# Patient Record
Sex: FEMALE | Race: WHITE
[De-identification: names, ages, dates, MRNs, and addresses within clinical notes are randomized per-mention and may not be internally consistent; named-entity substitution may affect disease eponyms.]

---

## 2018-04-11 ENCOUNTER — HOSPITAL ENCOUNTER (OUTPATIENT)
Dept: HOSPITAL 46 - DS | Age: 43
Discharge: HOME | End: 2018-04-11
Attending: OBSTETRICS & GYNECOLOGY
Payer: COMMERCIAL

## 2018-04-11 VITALS — WEIGHT: 205.01 LBS | HEIGHT: 68 IN | BODY MASS INDEX: 31.07 KG/M2

## 2018-04-11 DIAGNOSIS — D49.59: Primary | ICD-10-CM

## 2018-04-11 DIAGNOSIS — N80.0: ICD-10-CM

## 2018-04-11 DIAGNOSIS — Z98.890: ICD-10-CM

## 2018-04-11 DIAGNOSIS — N80.1: ICD-10-CM

## 2018-04-11 PROCEDURE — 0UT94ZZ RESECTION OF UTERUS, PERCUTANEOUS ENDOSCOPIC APPROACH: ICD-10-PCS | Performed by: OBSTETRICS & GYNECOLOGY

## 2018-04-11 PROCEDURE — 0U524ZZ DESTRUCTION OF BILATERAL OVARIES, PERCUTANEOUS ENDOSCOPIC APPROACH: ICD-10-PCS | Performed by: OBSTETRICS & GYNECOLOGY

## 2018-04-11 PROCEDURE — 0UT74ZZ RESECTION OF BILATERAL FALLOPIAN TUBES, PERCUTANEOUS ENDOSCOPIC APPROACH: ICD-10-PCS | Performed by: OBSTETRICS & GYNECOLOGY

## 2018-04-11 NOTE — NUR
LE 1200: PT ASSITED TO STAND UP BY THE BED TO REMOVE RORDIGUEZ. 10CC REMOVED FROM
RODRIGUEZ [place]OON, 1200 OF URINE IN BAG. PT REQUESTS WALKING AROUND THE DEPARTMENT
A LITTLE BIT BEFORE STOPPING IN THE BATHROOM. SHE IS ABLE TO URINATE, 15ML IN
HAT, PT REPORTS MORE URINE IN TOLIET BOWL BEFORE IT FLUSHED. SHE IS HELPED
BACK INTO BED AND GIVEN CRACKERS, SHE WOULD LIKE TO TRY THOSE FIRST BEFORE
SOMETHING MORE SUBSTANTIAL.

## 2018-04-11 NOTE — NUR
PT IS AWAKE AND OREINTED. SHE IS RATING HER PAIN A 1/10, WOULD LIKE MOTRIN
FOR THE ROAD.  GRABBED PT SOME LUNCH. PT WOULD LIKE TO GO HOME.

## 2018-04-11 NOTE — NUR
DC INSTRUCTIONS GIVEN TO PT AND  VERBALLY, THEY ARE OFFERED THE
OPPORTUNITY TO ASK QUESTIONS. PT TAKEN TO CAR IN WHEELCHAIR.

## 2018-04-13 NOTE — OR
[facility]
                                    [address]
                                  [place], [place]  [address]
_________________________________________________________________________________________
                                                                 Signed   
 
 
DATE OF OPERATION:
2018
 
SURGEON:
Kayy Lopez MD
 
FIRST ASSISTANT:
Ivan Smith MD
 
PREOPERATIVE DIAGNOSES:
Menorrhagia, adenomyosis, and pelvic pain.
 
POSTOPERATIVE DIAGNOSES:
Menorrhagia, adenomyosis, and pelvic pain with ovarian endometriosis.
 
PROCEDURES:
Total laparoscopic hysterectomy, bilateral salpingectomy, fulguration of endometriosis
and cystoscopy. 
 
ANESTHESIA:
General ET.
 
ESTIMATED BLOOD LOSS:
25 mL.
 
DRAINS:
Mckeon catheter.
 
INDICATIONS AND FINDINGS:
The patient is a 42-year-old female,  4, para 4, has a history of menorrhagia and
adenomyosis on ultrasound who has recently been having episodes of pelvic pain, which
are not necessarily associated with her period.  These have been fairly debilitating and
at this point, she desired definitive treatment for her symptoms.  At the time of
surgery, exam under anesthesia revealed a top normal size uterus.  It sounded to 10 cm.
There was no real descensus of the uterus.  On laparoscopy, the uterus appeared slightly
enlarged, but otherwise normal.  There was evidence of superficial endometriosis of
both ovaries.  There was no evidence of endometriosis in the posterior cul-de-sac or the
anterior cul-de-sac.  The appendix was visualized and seen to be normal as well. 
 
DESCRIPTION OF PROCEDURE:
The patient was prepped and draped in the dorsal lithotomy position.  A weighted
speculum was placed and the anterior lip of the cervix was visualized and grasped with a
 
    Electronically Signed By: KAYY LOPEZ MD  18 0937
_________________________________________________________________________________________
PATIENT NAME:     CHRIS TAN               
MEDICAL RECORD #: [id number]            OPERATIVE REPORT              
          ACCT #: [id number]  
DATE OF BIRTH:   [birth year]            REPORT #: [id number]      
PHYSICIAN:        KAYY LOPEZ MD            
PCP:              LATRICE ASTORGA MD           
REPORT IS CONFIDENTIAL AND NOT TO BE RELEASED WITHOUT AUTHORIZATION
 
 
                                  [facility]
                                    [address], [place]  [address]
_________________________________________________________________________________________
                                                                 Signed   
 
 
single-tooth tenaculum.  The cavity was sounded to 10 cm.  The endocervical canal was
then dilated slightly and the VCare cannula was placed and the balloon inflated at the
fundus.  The tenaculum and speculum were removed and the cup was fitted over the cervix
and a locking cap was fitted into place as well.  Gloves were changed.  Attention was
directed above.  The infraumbilical area was injected with 0.5% Marcaine plain.  An
incision was made with a knife.  Each layer was then serially elevated and incised until
the fascia was opened and identified.  Stay sutures of 0 Vicryl were placed on each
side.  The peritoneum was then opened bluntly.  The Erika cannula was placed and the
balloon intra-abdominally was inflated and this was also tied into place.  Placement of
the scope confirmed proper positioning.  CO2 was then introduced to the abdomen.
Cursory examination revealed fairly normal pelvis and it was felt that the procedure
planned 
was appropriate.  The secondary ports were placed lateral and slightly lower than
the umbilicus on each side.  These areas were transilluminated and injected with the
Marcaine.  An incision was made with a knife and the trocar placement was done under
direct vision.  The left side port was a 5 mm port and there was a balloon
intra-abdominally that was inflated.  The port on the right
side was a Veress needle followed by placement of a 10 mm expanding port.
Following this,
the abdomen was inspected and the decision was made to proceed with the planned surgery.
The patient's left tube was removed with serial cauterization with
an incision along the
mesosalpinx using the LigaSure Maryland device.  Following this, the patient's
utero-ovarian pedicle and round ligament were serially coagulated and divided.
Following this, the peritoneum was incised anteriorly allowing for partial bladder flap
creation.  The peritoneum was taken down posteriorly as well.  At this point, the
uterine vessels were skeletonized and coagulated multiple times and divided.  Further
dissection was done both posteriorly and anteriorly.  Attention was then directed to the
patient's right side where again the tube was removed using cautery along the meso-
salpinx again using the Maryland device.  Following this, the utero-ovarian pedicle and
round ligament were serially coagulated and divided as well.  The anterior leaf of the
peritoneum was incised allowing for completion of the bladder flap.  The peritoneum was
taken down posteriorly as well.  The uterine vessels were then skeletonized and
coagulated multiple times.  Further dissection was done both posterior and anteriorly
over the cup which could be seen.  Following this, the Sonicision device was used to
separate the specimen from the vaginal cuff.  This was begun posteriorly, wrapped around
to the left and anteriorly and then again from posteriorly and wrapped around to the
right.  The specimen was then completely .  Following this, attention was
directed down below and the specimen was removed vaginally.  A glove with a wet lap tape
was then placed into the vagina to allow for re-collection of the pneumoperitoneum.
Attention was redirected above after changing gloves again and the pelvis was irrigated
and inspected and good hemostasis was noted.  The cuff itself was then closed using the
 
    Electronically Signed By: KAYY LOPEZ MD  18 0937
_________________________________________________________________________________________
PATIENT NAME:     CHRIS TAN               
MEDICAL RECORD #: [id number]            OPERATIVE REPORT              
          ACCT #: [id number]  
DATE OF BIRTH:   [birth year]            REPORT #: [id number]      
PHYSICIAN:        KAYY LOPEZ MD            
PCP:              LATRICE ASTORGA MD           
REPORT IS CONFIDENTIAL AND NOT TO BE RELEASED WITHOUT AUTHORIZATION
 
 
                                  [facility]
                                    [address], [place]  [address]
_________________________________________________________________________________________
                                                                 Signed   
 
 
Endo Stitch device.  This was begun from the patient's right
uterosacral ligament coming through the vaginal mucosa posteriorly and then through the
vaginal mucosa and cuff anteriorly.  This was done from the patient's right side to the
left uterosacral and then back to the center.  Following this, good hemostasis was
noted.  At this point, the superficial endometriosis over the ovaries was treated using
the monopolar cautery using the spatula tip.  Both ovaries were treated.  The abdomen
was then again copiously irrigated and inspected and at this point, some bleeding was
noted at the right cuff angle.  The curved Maryland forceps and the cautery were then
used to superficially treat the edges of the cuff at the right angle.  This was
subjected to prolonged observation and the pressure of the abdomen was decreased and
again the abdomen was then copiously irrigated and there was no bleeding seen subsequent
to this.  It was felt that the bleeding had been controlled by the cautery.  Following
this, the instruments were removed from the abdomen after allowing as much CO2 as
possible to escape.  The fascial incision of the umbilicus was reidentified and closed
with a running suture of 0 Vicryl.  The skin incisions were closed with subcuticular
sutures of 3-0 Vicryl Rapide.  Attention was directed down below and the vaginal pack
was removed.  The Mckeon catheter was removed and cystoscopy done.  The patient had
received IV fluorescein.  The cystoscope was placed after placement of the introducer.
The 30-degree scope was used.  There was no evidence of any injury to the bladder
including the dome.  Both ureteral orifices were identified and clear urine was seen to
freely egress from both of these.  Following this, the cystoscopy was complete and the
instruments
removed after draining the bladder.  The Mckeon catheter was replaced.  A sponge
stick had been placed in the vagina both prior to cystoscopy and after and
there was minimal
if any blood found at that point.  All sponge and needle counts were correct.  She was
taken to the recovery room in good condition. 
 
 
 
            ________________________________________
            Kayy Lopez MD 
 
 
PJW/MODL
Job #:  [id number]
DD:  2018 09:38:39
DT:  2018 17:45:59
 
cc:            Ivan Smith MD
 
 
 
    Electronically Signed By: KAYY LOPEZ MD  18 0937
_________________________________________________________________________________________
PATIENT NAME:     CHRIS TAN               
MEDICAL RECORD #: [id number]            OPERATIVE REPORT              
          ACCT #: [id number]  
DATE OF BIRTH:   [birth year]            REPORT #: [id number]      
PHYSICIAN:        KAYY LOPEZ MD            
PCP:              LATRICE ASTORGA MD           
REPORT IS CONFIDENTIAL AND NOT TO BE RELEASED WITHOUT AUTHORIZATION
 
 
                                  [address]  [address]
_________________________________________________________________________________________
                                                                 Signed   
 
 
Copies:  IVAN SMITH MD
~
 
 
 
 
 
 
 
 
 
 
 
 
 
 
 
 
 
 
 
 
 
 
 
 
 
 
 
 
 
 
 
 
 
 
 
 
 
 
 
 
 
    Electronically Signed By: KAYY LOPEZ MD  18 0937
_________________________________________________________________________________________
PATIENT NAME:     RASANACHRIS               
MEDICAL RECORD #: [id number]            OPERATIVE REPORT              
          ACCT #: [id number]  
DATE OF BIRTH:   [birth year]            REPORT #: [id number]      
PHYSICIAN:        KAYY LOPEZ MD            
PCP:              LATRICE ASTORGA MD           
REPORT IS CONFIDENTIAL AND NOT TO BE RELEASED WITHOUT AUTHORIZATION

## 2023-04-13 ENCOUNTER — HOSPITAL ENCOUNTER (OUTPATIENT)
Dept: HOSPITAL 46 - DS | Age: 48
Discharge: HOME | End: 2023-04-13
Attending: SURGERY
Payer: COMMERCIAL

## 2023-04-13 VITALS — HEIGHT: 68 IN | WEIGHT: 213.85 LBS | BODY MASS INDEX: 32.41 KG/M2

## 2023-04-13 VITALS — SYSTOLIC BLOOD PRESSURE: 124 MMHG | DIASTOLIC BLOOD PRESSURE: 73 MMHG

## 2023-04-13 VITALS — SYSTOLIC BLOOD PRESSURE: 121 MMHG | DIASTOLIC BLOOD PRESSURE: 84 MMHG

## 2023-04-13 VITALS — SYSTOLIC BLOOD PRESSURE: 144 MMHG | DIASTOLIC BLOOD PRESSURE: 78 MMHG

## 2023-04-13 DIAGNOSIS — Z80.3: ICD-10-CM

## 2023-04-13 DIAGNOSIS — Z17.0: ICD-10-CM

## 2023-04-13 DIAGNOSIS — C50.112: Primary | ICD-10-CM

## 2023-04-13 PROCEDURE — A9541 TC99M SULFUR COLLOID: HCPCS

## 2023-04-13 PROCEDURE — 07B60ZZ EXCISION OF LEFT AXILLARY LYMPHATIC, OPEN APPROACH: ICD-10-PCS | Performed by: SURGERY

## 2023-04-13 PROCEDURE — 0HBU0ZZ EXCISION OF LEFT BREAST, OPEN APPROACH: ICD-10-PCS | Performed by: SURGERY

## 2023-04-13 NOTE — NUR
DRESSING CHECKED BEFORE PT WHEELED OUT OF DEPT, SMALL AMOUNT OF SHADOWING
NOTED ON DRESSING IN AXILLA.

## 2023-04-13 NOTE — NUR
04/13/23 1532 Clementina Contreras
1527- PT ARRIVES TO PACU REACTIVE TO STIMULI. PT UPDATED THAT HER
SURGERY IS COMPLETE. RESP EVEN AND UNLABORED. OXYGEN SAT HIGH 90'S TO
100% ON 6L VIA MASK.

## 2023-04-13 NOTE — NUR
PATIENT BACK IN DAY SURGERY ROOM FROM PACU. RATES PAIN 2-3/10 IN LEFT BREAST
AREA. DECLINES PAIN MEDICATION AT THIS TIME. GIVEN CRACKERS AND ICE WATER. IV
SITE WNL. SCDs ON. LEFT BREAST DRESSING CDI. LEFT AXILLA DRESSING CDI. VS
CHECKED. CALL LIGHT WITHIN REACH.  AT BEDSIDE.

## 2023-04-13 NOTE — NUR
1008 PT AMBULATED TO BATHROOM WITH MINIMAL ASSIST. DENIES PAIN AND NAUSEA, SHE
WAS ABLE TO VOID 400ML OF GREEN/BLUE URINE, ADVISED PT THAT URINE COLOR IS DUE
TO DYE THAT WAS INJECTED IN NODES. PT AMBULATED BACK TO ROOM

## 2023-04-14 NOTE — OR
[facility]
                                    [address] [place], [place]  [address]
_________________________________________________________________________________________
                                                                 Signed   
 
 
DATE OF OPERATION:
04/13/2023
 
SURGEON:
Mario Alberto Noriega MD
 
PREOPERATIVE DIAGNOSES:
1. Left central upper infiltrating ductal breast carcinoma (ER/AL positive, HER-2/sam
negative). 
2. Family history of breast cancer (paternal grandmother; BRCA testing of patient
negative). 
 
POSTOPERATIVE DIAGNOSES:
1. Left central upper infiltrating ductal breast carcinoma (ER/AL positive, HER-2/sam
negative). 
2. Family history of breast cancer (paternal grandmother; BRCA testing of patient
negative). 
 
PROCEDURE:
1. Injection of methylene blue dye for sentinel lymph node identification, left breast.
2. Left deep axillary sentinel lymph node biopsies (four nodes in total).
3. Left image guided partial mastectomy with cosmetic closure of large defect.
 
ANESTHESIA:
General, LMA, Smith Warner CRNA and Mert Waddell CRNA and local 20 mL of 0.25%
Marcaine with epinephrine. 
 
INDICATION:
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 Kayy Lopez and Ti Linton.  She was found to have an abnormal mammogram and
underwent image guided biopsy of the left superior central breast.  This confirmed
infiltrating ductal carcinoma.  ER/AL receptors were positive and HER-2/sam receptor was
negative.  She does have family history of breast cancer in her sister at age 48.  She
has had a paternal aunt with breast cancer as well as a maternal cousin.  On that basis,
she did undergo BRCA testing which was negative as well as MRI of the breast showing no
other lesions, particularly on the right side (she was noted to have upper outer
quadrant right breast thickening without distinct mass). 
 
She has been presented her options of management and is now to undergo breast
conservation therapy approach to her left breast cancer.  This will include image guided
excision of portion of the breast anticipating clinically and pathologically negative
margins as well as sentinel lymph node biopsies of the left axilla.  The risk of
 
    Electronically Signed By: MARIO ALBERTO NORIEGA MD  04/14/23 1524
_________________________________________________________________________________________
PATIENT NAME:     CHRIS TAN               
MEDICAL RECORD #: [id number]            OPERATIVE REPORT              
          ACCT #: [id number]  
DATE OF BIRTH:   [birth year]            REPORT #: [id number]      
PHYSICIAN:        MARIO ALBERTO NORIEGA MD                 
PCP:              TI LINTON  PAC      
REPORT IS CONFIDENTIAL AND NOT TO BE RELEASED WITHOUT AUTHORIZATION
 
 
                                  [facility]
                                    [address], [place]  [address]
_________________________________________________________________________________________
                                                                 Signed   
 
 
bleeding, infection, need for additional surgery should margins be positive on the
excision or multiple lymph nodes to be positive on sentinel lymph node biopsy were all
reviewed with her.  She does have large breasts and therefore, cosmetic deformity may be
less than in other patients and every effort will be made to maintain good cosmesis.
The risk of the operation are well understood by patient and her family and she wished
to proceed. 
 
FINDINGS:
Needle localization by the radiologist noted the entry point to the medial breast.  Good
localization was noted.  Excision of the tumor with surrounding normal tissue was found
to have complete excision of the lesion in question as noted by the marker within the
substance of the breast with a clinically and radiographically apparent negative margin. 
 
As regard to the left axilla, there was essentially no uptake of blue dye but good
uptake of radionuclide.  Four lymph nodes that were "hot" were excised and an additional
"cold" lymph node also excised.  There were all clinically not suspicious and quite
small overall.  Good cosmesis was maintained from operation. 
 
DESCRIPTION OF PROCEDURE:
The patient was received from the radiology department, taken to the operating room.
She was given a general LMA type anesthetic.  Careful padding of pressure points was
undertaken.  Protective tape from the wire localization was removed.  The wire entered
through the medial aspect of the left breast, directed to the central upper breast area.
 There is no palpable abnormality of the breast and the axilla was clinically negative
as well.  1 mL of methylene blue dye was injected in the subepithelial space in the
upper outer aspect of the left areola.  I conferred with radiologist prior to the
procedure, noting that the localizing wire "skewered" the lesion, though the tip of the
needle was at least 2 cm beyond it.  The left breast, axilla and upper arm were prepared
with spray Betadine solution and draped sterilely.  The C-Trak gamma probe device was
covered with sterile sheath and interrogation of the left axilla undertaken.  The area
of maximal signal was marked and a transverse incision was made in the axilla.
Dissection was carried through the subcutaneous tissue with blunt electrocautery
dissection entering the axillary fat pad.  Using the probe to better localize the
sentinel lymph node, blunt dissection was undertaken.  Strangely, there was no sign of
blue lymphatic channels as there normally would be but with the use of the probe
dissection was able to be undertaken guiding dissection to the upper axilla dominantly.
Ultimately, four sentinel lymph nodes were excised.  Clips were applied to lymphatic
channels and small vessels as necessary.  The sentinel lymph nodes were higher and more
medial in the axilla than generally seen.  There is little if any uptake in the lower
axilla.  Once these sentinel lymph nodes and some additional axillary tissue was sent
for permanent pathology, the depths of the wound were packed with plain laparotomy
gauze.  Attention was turned towards the primary lesion.  Although the most direct
 
    Electronically Signed By: MARIO ALBERTO NORIEGA MD  04/14/23 1524
_________________________________________________________________________________________
PATIENT NAME:     CHRIS TAN               
MEDICAL RECORD #: [id number]            OPERATIVE REPORT              
          ACCT #: [id number]  
DATE OF BIRTH:   [birth year]            REPORT #: [id number]      
PHYSICIAN:        MARIO ALBERTO NORIEGA MD                 
PCP:              TI LINTON  PAC      
REPORT IS CONFIDENTIAL AND NOT TO BE RELEASED WITHOUT AUTHORIZATION
 
 
                                  [facility]
                                    [address], [place]  [address]
_________________________________________________________________________________________
                                                                 Signed   
 
 
approach would have been an incision directly over the upper central breast in
conjunction with the needle, a more cosmetic approach was deemed possible by using a
circumareolar incision.  The areolar margin was marked and put on tension.  A
curvilinear incision on the areolar margin undertaken with a 15 blade.  Dissection was
carried through the dermis with electrocautery.  Blood vessels that were identified were
ligated with 2-0 Vicryl ties.  A flap was elevated in the superior medial aspect,
ultimately identifying the wire which was then delivered into the wound.  An Allis clamp
was applied to the parenchyma of the breast in conjunction with the breast tissue.
Using electrocautery, conical excision was undertaken measuring approximately 5 cm x 5
cm x 5 cm.  This was taken down essentially to the pectoralis muscle.  Prior to
explanting the tissue from the breast itself, the superior and lateral margins were
marked with a short and long silk suture respectively.  The wound was packed with gauze.
 Specimen sent for specimen radiography is ultimately confirmed to have the offending
lesion and the marker within the specimen.  Electrocautery was used for hemostasis
within the depths of the breast tissue.  Irrigation was undertaken with sterile water.
The area in question was marked with small clips to guide for future radiology
intervention (radiation therapy).  The parenchyma was reapproximated in a cosmetically
acceptable way with interrupted 2-0 Vicryl.  The wrist had been applied to the depths of
the wound itself to assist with hemostasis.  Deep dermal interrupted 2-0 Vicryl sutures
were placed and a running subcuticular 3-0 Vicryl was undertaken as well. 
 
Attention was turned to the left axilla.  Inspection showed no sign of bleeding or other
problem.  Some Vivian was applied to the deep axilla as well.  This wound was closed
with interrupted 2-0 Vicryl in deep layer and a running subcuticular 3-0 Vicryl for the
skin.  Steri-Strips were applied to both sites as were Acticoat dressings.  The patient
was ultimately extubated and transported to the recovery room in good condition having
suffered no complication.  Sponge, needle, and instrument counts were reported correct
x3. Blood loss was less than 30 mL in aggregate. Sponge and instrument counts were
reported as correct x3. 
 
 
 
            ________________________________________
            Mario Alberto Noriega MD 
 
 
JM/MODL
Job #:  [id number]
DD:  04/13/2023 15:43:16
DT:  04/13/2023 16:52:35
 
cc:            Kayy Lopez MD 
 
    Electronically Signed By: MARIO ALBERTO NORIEGA MD  04/14/23 1524
_________________________________________________________________________________________
PATIENT NAME:     CHRIS TAN               
MEDICAL RECORD #: [id number]            OPERATIVE REPORT              
          ACCT #: [id number]  
DATE OF BIRTH:   [birth year]            REPORT #: [id number]      
PHYSICIAN:        MARIO ALBERTO NORIEGA MD                 
PCP:              TI LINTON  PAC      
REPORT IS CONFIDENTIAL AND NOT TO BE RELEASED WITHOUT AUTHORIZATION
 
 
                                  [address]  [address]
_________________________________________________________________________________________
                                                                 Signed   
 
 
 
               MELISSA Laureano MD, PH.D.
 
 
Copies:  KAYY LOPEZ MD, JUNO
~
 
 
 
 
 
 
 
 
 
 
 
 
 
 
 
 
 
 
 
 
 
 
 
 
 
 
 
 
 
 
 
 
 
 
    Electronically Signed By: MARIO ALBERTO NORIEGA MD  04/14/23 1524
_________________________________________________________________________________________
PATIENT NAME:     CHRIS TAN               
MEDICAL RECORD #: [id number]            OPERATIVE REPORT              
          ACCT #: [id number]  
DATE OF BIRTH:   [birth year]            REPORT #: [id number]      
PHYSICIAN:        MARIO ALBERTO NORIEGA MD                 
PCP:              TI LINTON  PAC      
REPORT IS CONFIDENTIAL AND NOT TO BE RELEASED WITHOUT AUTHORIZATION

## 2023-04-18 NOTE — PATH
[facility]
                                    [address] [facility]
                                  [place], [place]  [address]
_________________________________________________________________________________________
                                                                 Signed   
 
 
 
SPECIMEN(S): A 1ST SLN  NON-SENTINEL NODE
SPECIMEN(S): B 2ND SLN
SPECIMEN(S): C ADDITIONAL LYMPH TISSUE
SPECIMEN(S): D 3RD SLN
SPECIMEN(S): E 4TH SLN
SPECIMEN(S): F LEFT SUPERIOR CENTRAL BREAST TISSUE
 
SPECIMEN SOURCE:
A. 1ST SLN  NON-SENTINEL NODE
B. 2ND SLN
C. ADDITIONAL LYMPH TISSUE
D. 3RD SLN
E. 4TH SLN
F. LEFT SUPERIOR CENTRAL BREAST TISSUE
 
CLINICAL HISTORY:
Infiltrating duct carcinoma.  Left breast lumpectomy with needle loc.
Specimen Time to Fixation- 04/13/2023 2:50:00 PM
 
FINAL PATHOLOGIC DIAGNOSIS:
A.  Lymph node, sentinel and non-sentinel, excision:
-  Two lymph nodes, negative for metastatic carcinoma (0/2).
B.  Lymph node, sentinel, excision #2:
-  One lymph node, negative for metastatic carcinoma (0/1).
C.  Designated "additional lymph tissue":
-  One lymph node, negative for metastatic carcinoma (0/1).
D.  Lymph node, sentinel, excision #3:
-  Two lymph nodes, negative for metastatic carcinoma (0/2).
E.  Lymph node, sentinel, excision #4:
-  One lymph node, negative for metastatic carcinoma (0/1).
F.  Breast, left superior central, lumpectomy:
-  Invasive ductal carcinoma with the following features:
-  Histologic grade:
-  Glandular differentiation:  Score 3.
-  Nuclear pleomorphism:  Score 3.
-  Mitotic rate:  Score 3.
-  Overall grade: 3/3 (total score 9/9).
-  Tumor size:  30 mm in greatest dimension.
-  Ductal carcinoma in situ:  Present, high grade, comedo and cribriform type, 
with necrosis. 
 
 
                                                                                    
_________________________________________________________________________________________
PATIENT NAME:     CHRIS TAN               
MEDICAL RECORD #: [id number]            PATHOLOGY                     
          ACCT #: [id number]       ACCESSION #: [id number]     
DATE OF BIRTH:   [birth year]            REPORT #: [id number]       
PHYSICIAN:        [facility]              
PCP:              TI LINTON  PAC      
REPORT IS CONFIDENTIAL AND NOT TO BE RELEASED WITHOUT AUTHORIZATION
 
 
                                  [facility]
                                    [address] [place], [place]  [address]
_________________________________________________________________________________________
                                                                 Signed   
 
 
-  Negative for extensive intraductal component.
-  Lymph-vascular invasion:  Present, extensive.
-  Microcalcifications:  Present in invasive carcinoma, DCIS, and 
non-neoplastic tissue. 
-  Treatment effect in the breast:  No known presurgical therapy.
-  Margins:
-  Margin status for invasive carcinoma:  All margins negative for invasive 
carcinoma. 
-  Distance from invasive carcinoma to closest margin:  1 mm, anterior margin.
-  Margin status for DCIS:  All margins negative for DCIS.
-  Distance from DCIS to closest margin:  4 mm, anterior margin.
-  Regional lymph nodes:  All regional lymph nodes negative for tumor.
-  Total number of lymph nodes examined:  7.
-  Number of sentinel nodes examined:  5.
-  Ancillary studies:  Please refer to studies previously performed (accession 
number [id number]-) which were reported as ER positive, PA positive, and HER-2 
negative by IHC (score 0). 
-  Pathologic stage classification:  pT2 pN0.
 
COMMENT:
For parts A through E, immunohistochemical stains for AE1/AE3 are performed and 
support the above interpretation.  For part F, immunohistochemical stains for 
CD31 are performed on selected blocks and 
support the above interpretation.
As part of [facility]' Quality Improvement Program, this case was 
reviewed by another member of our pathology staff. 
BRP:AMB:sonya:[id number]
 
MICROSCOPIC EXAMINATION:
Histologic sections of all submitted blocks are examined by light microscopy.  
These findings, together with the gross examination, support the pathologic 
diagnosis. 
 
GROSS DESCRIPTION:
A. The specimen, labeled and designated "Neveau, first SLN and non-sentinel 
node," is received in formalin and consists of a single fragment of yellow, 
lobular adipose tissue measuring 3.3 x 2.5 x 1.0 
cm.  A dissection through the fat reveals two tan lymph node candidates 
measuring 1.1 x 0.9 x 0.8 cm and 2.0 x 1.2 x 0.9 cm.  Each lymph node is 
entirely submitted as follows: 
Cassette Summary:
(A1)       One lymph node candidate, trisected
 
                                                                                    
_________________________________________________________________________________________
PATIENT NAME:     CHRIS TAN               
MEDICAL RECORD #: [id number]            PATHOLOGY                     
          ACCT #: [id number]       ACCESSION #: [id number]     
DATE OF BIRTH:   [birth year]            REPORT #: [id number]       
PHYSICIAN:        GENO WILKINS              
PCP:              TI LINTON  PAC      
REPORT IS CONFIDENTIAL AND NOT TO BE RELEASED WITHOUT AUTHORIZATION
 
 
                                  [facility]
                                    [address] [place] [address]  [address]
_________________________________________________________________________________________
                                                                 Signed   
 
 
(A2-A3) One lymph node candidate, quadrisected
B. The specimen, labeled and designated "Neveau, second SLN," is received in 
formalin and consists of a single tan lymph node candidate with minimal 
adherent fatty tissue measuring 0.6 x 0.6 x 0.3 cm. 
The lymph node candidate is bisected and entirely submitted in (B1).
C. The specimen, labeled and designated "Neveau, additional lymph tissue," is 
received in formalin and consists of a single fragment of yellow-red, lobular 
adipose tissue measuring 3.0 x 2.4 x 1.3 cm. 
 
A dissection through the fat reveals a single tan lymph node candidate 
measuring 0.8 x 0.7 x 0.5 cm.  The lymph node candidate is bisected and 
entirely submitted in (C1). 
D. The specimen, labeled and designated "Neveau, third SLN," is received in 
formalin and consists of a single fragment of yellow, lobular adipose tissue 
measuring 2.2 x 1.6 x 0.9 cm.  A dissection 
through the fat reveals two tan lymph node candidates measuring 0.1 and 0.4 cm 
in greatest dimension.  The larger lymph node is bisected, and each are 
entirely submitted in (D1). 
E. The specimen, labeled and designated "Neveau, fourth SLN," is received in 
formalin and consists of a single fragment of yellow, lobular adipose tissue 
measuring 1.6 x 1.5 x 1.0 cm.  The fat is 
trimmed to reveal a single tan lymph node candidate measuring 1.5 x 0.9 x 0.6 
cm.  The lymph node candidate is trisected and entirely submitted in (E1). 
 
F. The specimen, labeled and designated "Neveau, left superior central breast 
tissue," is received in formalin and consists of a 69.5 g oriented lumpectomy 
specimen measuring 7.7 cm from medial to 
lateral, 6.2 cm from anterior to posterior and 4.4 cm from superior to 
inferior.  A single wire-guided localization needle is noted entering through 
the anterior aspect.  Two sutures are noted which 
are designated as per the requisition: Short-superior and long-lateral.  No 
skin is identified.  The margins are inked as follows: Superior-blue, 
inferior-green, anterior-yellow, posterior-black, 
medial-red and lateral-orange. The tissue is serially sectioned from medial to 
lateral into 14 slices to reveal, located in slices 5-11, is a white, firm, 
ill-defined mass measuring 3.0 cm from medial 
to lateral, 1.7 cm from anterior to posterior and 1.0 cm from superior to 
inferior.  A biopsy clip is identified in slice 7.  The mass is located 0.4 cm 
from superior with finger-like extensions to 
 
anterior and posterior.  All remaining margins are greater than 0.5 cm.  The 
remaining breast parenchyma shows yellow, lobulated cut surfaces with focal 
 
                                                                                    
_________________________________________________________________________________________
PATIENT NAME:     CHRIS TAN               
MEDICAL RECORD #: [id number]            PATHOLOGY                     
          ACCT #: [id number]       ACCESSION #: [id number]     
DATE OF BIRTH:   [birth year]            REPORT #: [id number]       
PHYSICIAN:        GENO WILKINS              
PCP:              TI LINTON  PAC      
REPORT IS CONFIDENTIAL AND NOT TO BE RELEASED WITHOUT AUTHORIZATION
 
 
                                  [facility]
                                    [address] [place], [place]  [address]
_________________________________________________________________________________________
                                                                 Signed   
 
 
areas of white, firm, suspicious fibrous 
tissue.  No further definitive lesions are seen.  Representative sections are 
submitted as follows: 
Cassette Summary:
(F1)     Slice 1, medial, perpendicular (three slices)
(F2)     Slice 2, suspicious fibrous tissue in relation to anterior and 
superior 
(F3)     Slice 3, suspicious fibrous tissue in relation to anterior
(F4)     Slice 4, area adjacent medial to mass to include inferior and deep
(F5)     Slice 5, mass in relation to superior
(F6)     Slice 6, mass in relation to superior and deep
(F7)     Slice 7, mass in relation to superior and anterior
(F8)     Slice 8, mass in relation to superior and anterior
(F9)     Slice 9, mass in relation to superior and anterior
(F10)   Slice 10, mass in relation to superior and anterior/shave of inferior
 
(F11)   Slice 11 presumable fibrous tissue in relation to superior and anterior
(F12)   Slice 11, presumable fibrous tissue in relation to superior inferior 
and anterior 
(F13)   Slice 13 in relation to inferior
(F14)   Slice 14, lateral, perpendicular (three slices)
RANDY  (under the direct supervision of a pathologist)
Time of collection: 2:41 PM, 04/13/2023.
Time into formalin: 2:50 PM, 04/13/2023.
Processor load time: 4:00 PM.
Ischemic time: 9 minutes.
Total fixation time in formalin: 25 hours 10 minutes.
The ASCO/CAP guidelines related to HER2 and hormone receptor testing in breast 
specimens have been met and the specimen has been placed in formalin within one 
hour and fixed in 10% neutral buffered 
formalin for 6 to 72 hours.
The Gross Description was prepared using a voice recognition system. The report 
was reviewed for accuracy; however, sound-alike word errors, addition and/or 
deletions may occur. If there is any 
question about this report, please contact Client Services.
 
ADDITIONAL NOTES:
Immunohistochemical and/or in situ hybridization studies were performed on this 
case with the appropriate positive controls that react as expected.  This test 
was developed and its performance 
characteristics determined by [facility].  It has not been cleared or 
approved by the U.S. Food and Drug Administration.  The FDA has determined that 
 
                                                                                    
_________________________________________________________________________________________
PATIENT NAME:     CHRIS TAN               
MEDICAL RECORD #: [id number]            PATHOLOGY                     
          ACCT #: [id number]       ACCESSION #: [id number]     
DATE OF BIRTH:   [birth year]            REPORT #: [id number]       
PHYSICIAN:        GENO WILKINS              
PCP:              TI LINTON  PAC      
REPORT IS CONFIDENTIAL AND NOT TO BE RELEASED WITHOUT AUTHORIZATION
 
 
                                  [address]  [address]
_________________________________________________________________________________________
                                                                 Signed   
 
 
such clearance or approval is not 
necessary.  This test is used for clinical purposes.  It should not be regarded 
as investigational or for research.  [facility] is certified under the 
Clinical Laboratory Improvement 
Amendments of 1988 (CLIA) as qualified to perform high complexity clinical 
laboratory testing.  This assay has not been validated for specimens that have 
been decalcified. 
 
PERFORMING LABORATORY:
The technical component was performed by [facility], [address] [place] [id number] (CLIA# [id number]). Professional interpretation was 
performed by [facility], [address] [address] (CLIA#:  [id number]).
 
Diagnostician:  Gerald Fairchild MD
Pathologist
Electronically Signed 04/18/2023
 
 
Copies:                                
~
 
 
 
 
 
 
 
 
 
 
 
 
 
 
 
 
 
 
 
 
 
 
                                                                                    
_________________________________________________________________________________________
PATIENT NAME:     CHRIS TAN               
MEDICAL RECORD #: [id number]            PATHOLOGY                     
          ACCT #: [id number]       ACCESSION #: [id number]     
DATE OF BIRTH:   [birth year]            REPORT #: [id number]       
PHYSICIAN:        GENO WILKINS              
PCP:              TI LINTON  PAC      
REPORT IS CONFIDENTIAL AND NOT TO BE RELEASED WITHOUT AUTHORIZATION

## 2023-10-09 VITALS — SYSTOLIC BLOOD PRESSURE: 102 MMHG | DIASTOLIC BLOOD PRESSURE: 72 MMHG

## 2023-10-18 ENCOUNTER — HOSPITAL ENCOUNTER (OUTPATIENT)
Dept: HOSPITAL 46 - DS | Age: 48
Discharge: HOME | End: 2023-10-18
Attending: OBSTETRICS & GYNECOLOGY
Payer: COMMERCIAL

## 2023-10-18 VITALS — HEIGHT: 68 IN | BODY MASS INDEX: 31.89 KG/M2 | WEIGHT: 210.43 LBS

## 2023-10-18 VITALS — DIASTOLIC BLOOD PRESSURE: 89 MMHG | SYSTOLIC BLOOD PRESSURE: 129 MMHG

## 2023-10-18 VITALS — SYSTOLIC BLOOD PRESSURE: 128 MMHG | DIASTOLIC BLOOD PRESSURE: 78 MMHG

## 2023-10-18 VITALS — DIASTOLIC BLOOD PRESSURE: 87 MMHG | SYSTOLIC BLOOD PRESSURE: 132 MMHG

## 2023-10-18 DIAGNOSIS — C50.912: Primary | ICD-10-CM

## 2023-10-18 DIAGNOSIS — Z79.810: ICD-10-CM

## 2023-10-18 PROCEDURE — 0UB24ZZ EXCISION OF BILATERAL OVARIES, PERCUTANEOUS ENDOSCOPIC APPROACH: ICD-10-PCS | Performed by: OBSTETRICS & GYNECOLOGY

## 2023-10-18 NOTE — NUR
CT9913: PT RESTING IN STRETCHER AWAKE AND ALERT, DENIES NAUSEA FROM PO PAIN
MEDS AND RATES PAIN 3/10 AND AS TOLERABLE. PT SITS AT SIDE OF BED PRIOR TO
STANDING, DENIES DIZZINESS. STEADY AMBULATION WITH RN ASSIST TO BR, VOIDS 100
MLS YELLOW URINE WITH NO CLOTS PRESENT. PT STATES NOT NEEDING PERIPAD OR MESH
PANTIES AND DRESSES SELF WITH SPOUSE AT BEDSIDE.
[id number]: DC INSTRUCTIONS PRESENTED VERBALLY AND WRITTEN TO PT AND SPOUSE. PT
PROVIDED PAIN PRESCRIPTION IN DC FOLDER. PT TRANSFERS SELF FROM STRETCHER TO
 AND DC'S FROM DS TREATMENT ROOM TO SPOUSE PERSONAL VEHICLE TO HOME.

## 2023-10-18 NOTE — NUR
10/18/23 0829 Abi Messina
0822-PATIENT ARRIVED TO PACU ON 6L MASK RR EVEN PATIENT NONAROUSABLE
HOB ELEVATED. SB HR 50'S 3 LAP SITES TO ABDOMEN CDI. IVF INFUSING.
 
0829-PATIENT STARTING TO AROUSE EYES CLOSED MOVES LOWER EXTREMITIES.
6L MASK RR EVEN 100%

## 2023-10-18 NOTE — EKG
[facility]
                                    [address] [facility]
                                  [place]  [address]
_________________________________________________________________________________________
                                                                 Signed   
 
 
Sinus bradycardia
Low voltage QRS
Borderline ECG
When compared with ECG of 27-MAR-2023 06:39,
No significant change was found
Confirmed by Kristan Garcia MD ([id number]) on 10/18/2023 8:33:35 PM
 
 
 
 
 
 
 
 
 
 
 
 
 
 
 
 
 
 
 
 
 
 
 
 
 
 
 
 
 
 
 
 
 
 
 
 
 
 
 
    Electronically Signed By: KRISTAN GARCIA MD  10/18/23 2033
_________________________________________________________________________________________
PATIENT NAME:     RAMAEVE HOODCHRISDILCIA PARDO               
MEDICAL RECORD #: [id number]                     Electrocardiogram             
          ACCT #: [id number]  
DATE OF BIRTH:   [birth year]                                       
PHYSICIAN:   KRISTAN GARCIA MD                 REPORT #: [id number]
REPORT IS CONFIDENTIAL AND NOT TO BE RELEASED WITHOUT AUTHORIZATION

## 2023-10-18 NOTE — NUR
0855: PT BACK TO DS TREATMENT ROOM FROM PACU VIA STRETCHER. PT DROWSY BUT
AROUSES TO VERBAL STIMULATION AND ANSWERS QUESTIONS APPROPRIATELY. PT RATES
PAIN 5/10 AND "PRETTY ACHEY," PROVIDED WATER AND CRACKERS IN ANTICIPATION OF
PO PAIN MEDS. PT SPOUSE AT BEDSIDE, CALL LIGHT WITHIN REACH.

## 2023-10-22 NOTE — OR
[facility]
                                    [address], [place]  [address]
_________________________________________________________________________________________
                                                                 Signed   
 
 
DATE OF OPERATION:
10/18/2023
 
SURGEON:
Kayy Lopez MD
 
FIRST ASSISTANT:
KESHIA Barajas DO
 
PREOPERATIVE DIAGNOSIS:
Breast cancer.
 
POSTOPERATIVE DIAGNOSIS:
Breast cancer, normal pelvis and abdomen.
 
PROCEDURE:
Laparoscopy with bilateral oophorectomy.
 
ANESTHESIA:
General ET.
 
ESTIMATED BLOOD LOSS:
Minimal.
 
DRAINS:
None.
 
INDICATIONS AND FINDINGS:
The patient is a 47-year-old female, who was diagnosed with breast cancer this year,
which was ER positive.  She now desires removal of her ovaries to further decrease her
estrogen stimulation.  At the time of surgery, exam under anesthesia was normal.  At the
time of laparoscopy, the ovaries were small and normal.  She has undergone prior
hysterectomy with removal of the tubes.  The liver, gallbladder and appendix were also
found to be normal. 
 
DESCRIPTION OF PROCEDURE:
The patient was prepped and draped in the dorsal lithotomy position.  A sponge stick was
placed in the vagina and a Mckeon catheter was placed initially.  Attention was directed
above and the infraumbilical area was injected with 0.5% Marcaine plain.  An incision
was made with a knife.  Each layer was serially elevated incised until the fascia was
opened and identified.  Stay sutures of 0 Vicryl were placed.  The peritoneum was opened
bluntly.  The Erika cannula was placed and tied into place with the balloon inflated.
 
    Electronically Signed By: KAYY LOPEZ MD  10/22/23 1030
_________________________________________________________________________________________
PATIENT NAME:     CHRIS TAN               
MEDICAL RECORD #: [id number]            OPERATIVE REPORT              
          ACCT #: [id number]  
DATE OF BIRTH:   [birth year]            REPORT #: [id number]      
PHYSICIAN:        KAYY LOPEZ MD            
PCP:              TI LINTON  PAC      
REPORT IS CONFIDENTIAL AND NOT TO BE RELEASED WITHOUT AUTHORIZATION
 
 
                                  [facility]
                                    [address], [place]  [address]
_________________________________________________________________________________________
                                                                 Signed   
 
 
Placement of the scope confirmed proper positioning.  The pelvis was visualized and
appeared normal and the planned procedure appropriate.  The secondary ports were then
placed.  These were placed laterally slightly below the level of the umbilicus.  Each of
these was transilluminated, injected with Marcaine, incision made with a knife and the
trocars placed under direct vision.  The left-sided port was a 5 mm port and the right
was the Veress needle with the expanding port.  Following this, the LigaSure [place]
device was used to remove the ovaries.  On the patient's right side, the peritoneum
lateral to the ovary was incised allowing to isolate the infundibulopelvic ligament.
This was taken quite high.  The ureter was easily seen.  The infundibulopelvic ligament
was isolated and this was cauterized multiple times using the LigaSure device.  It was
then divided.  The remaining connection of the ovary to the pelvic sidewall was then
divided and the specimen placed into the cul-de-sac.  The Endoloop was then used on
the infundibulopelvic ligament using 0 PDS to further assure hemostasis.
Attention was then directed to the patient's left side.  Again, the peritoneum laterally
was incised.  The infundibulopelvic ligament was isolated again with good vision of the
ureter.  This ligament was cauterized multiple times and then divided.  The remaining
pedicle was  with the LigaSure device.  The specimen was  and placed
in the cul-de-sac.  An Endoloop of 0 PDS was placed over the infundibulopelvic ligament.
 Following this, the abdomen was irrigated and inspected and good hemostasis was noted.
An endobag was placed through the central port and the ovaries were placed into the bag
and retrieved through the central port.  After retrieval, the Erika cannula was
replaced and again the pelvis was visualized.  There was no evidence of any
ongoing bleeding.  The instruments were removed from the abdomen after allowing as much
CO2 as possible to escape.  The fascial incision of the umbilicus was re-identified and
closed with running suture of 0 Vicryl.  The skin incisions were closed with
subcuticular sutures of 3-0 Vicryl Rapide.  Attention was directed down below and the
sponge stick as well as a Mckeon catheter were removed.  All sponge and needle counts
were correct.  She tolerated the procedure well and was taken to the recovery room in
good condition. 
 
 
 
            ________________________________________
            Kayy Lopez MD 
 
 
PJW/MODL
Job #:  [id number]
DD:  10/18/2023 08:30:26
DT:  10/18/2023 09:18:02
 
 
 
    Electronically Signed By: KAYY LOPEZ MD  10/22/23 1030
_________________________________________________________________________________________
PATIENT NAME:     CHRIS TAN               
MEDICAL RECORD #: [id number]            OPERATIVE REPORT              
          ACCT #: [id number]  
DATE OF BIRTH:   [birth year]            REPORT #: [id number]      
PHYSICIAN:        KAYY LOPEZ MD            
PCP:              TI LINTON  PAC      
REPORT IS CONFIDENTIAL AND NOT TO BE RELEASED WITHOUT AUTHORIZATION
 
 
                                  [address]  [address]
_________________________________________________________________________________________
                                                                 Signed   
 
 
Copies:                                
~
 
 
 
 
 
 
 
 
 
 
 
 
 
 
 
 
 
 
 
 
 
 
 
 
 
 
 
 
 
 
 
 
 
 
 
 
 
 
 
 
 
    Electronically Signed By: KAYY LOPEZ MD  10/22/23 1030
_________________________________________________________________________________________
PATIENT NAME:     CHRIS TAN               
MEDICAL RECORD #: [id number]            OPERATIVE REPORT              
          ACCT #: [id number]  
DATE OF BIRTH:   [birth year]            REPORT #: [id number]      
PHYSICIAN:        KAYY LOPEZ MD            
PCP:              TI LINTON  PAC      
REPORT IS CONFIDENTIAL AND NOT TO BE RELEASED WITHOUT AUTHORIZATION

## 2023-10-24 NOTE — PATH
[facility]
                                    [address], [place]  [address]
_________________________________________________________________________________________
                                                                 Signed   
 
 
 
SPECIMEN(S): A BILATERAL OVARIES
 
SPECIMEN SOURCE:
A. BILATERAL OVARIES
 
CLINICAL HISTORY:
Bilateral ovaries. Malignant neoplasm of L. breast.
 
FINAL PATHOLOGIC DIAGNOSIS:
Bilateral ovaries:
-  Benign bilateral ovaries with incidental simple serous cysts.
JVR:denilson
 
MICROSCOPIC EXAMINATION:
Histologic sections of all submitted blocks are examined by light microscopy.  
These findings, together with the gross examination, support the pathologic 
diagnosis. 
 
GROSS DESCRIPTION:
The specimen, labeled and designated "SELENA Tan, bilateral ovaries," is 
received in formalin and consists of two separate undesignated ovaries.  The 
first is 3 g, 2.1 x 1.8 x 1.0 cm.  The external 
surface is tan-white and cerebriform.  Serially sectioning reveals a 
tan-orange-pink variegated ovarian parenchyma.  The second is 5 g, 2.5 x 2.1 x 
1.3 cm.  The external surface is tan-white and 
cerebriform.  Serially sectioning reveals a pink-white-orange variegated 
ovarian parenchyma with one multiloculated clear watery fluid-filled cystic 
structure that is 1.3 cm in greatest dimension.  No 
papillary excrescences are grossly identified.  The cyst is entirely submitted 
for histologic examination. Representative sections are submitted in two 
cassettes. 
Cassette Summary:
(A1)     first ovary
(A2)     second ovary
FB  (under the direct supervision of a pathologist)
 
The Gross Description was prepared using a voice recognition system. The report 
was reviewed for accuracy; however, sound-alike word errors, addition and/or 
deletions may occur. If there is any 
question about this report, please contact Client Services.
 
 
                                                                                    
_________________________________________________________________________________________
PATIENT NAME:     CHRIS TAN               
MEDICAL RECORD #: [id number]            PATHOLOGY                     
          ACCT #: [id number]       ACCESSION #: [id number]     
DATE OF BIRTH:   [birth year]            REPORT #: [id number]       
PHYSICIAN:        [facility] PATH[facility]Y              
PCP:              TI LINTON  PAC      
REPORT IS CONFIDENTIAL AND NOT TO BE RELEASED WITHOUT AUTHORIZATION
 
 
                                  [facility]
                                    [address], [place]  [address]
_________________________________________________________________________________________
                                                                 Signed   
 
 
PERFORMING LABORATORY:
Technical component was performed by [facility], [address] [place] [id number] (CLIA# [id number]).  Professional interpretation was 
performed by [facility], 
[address] [place] [id number] (CLIA#: [id number]).
 
Diagnostician:  Abimael Yanez MD
Pathologist
Electronically Signed 10/24/2023
 
 
Copies:                                
~
 
 
 
 
 
 
 
 
 
 
 
 
 
 
 
 
 
 
 
 
 
 
 
 
 
 
 
 
 
 
                                                                                    
_________________________________________________________________________________________
PATIENT NAME:     CHRIS TAN               
MEDICAL RECORD #: [id number]            PATHOLOGY                     
          ACCT #: [id number]       ACCESSION #: [id number]     
DATE OF BIRTH:   [birth year]            REPORT #: [id number]       
PHYSICIAN:        GENO PATHOLOGY              
PCP:              IT LINTON  PAC      
REPORT IS CONFIDENTIAL AND NOT TO BE RELEASED WITHOUT AUTHORIZATION

## 2024-10-18 ENCOUNTER — HOSPITAL ENCOUNTER (OUTPATIENT)
Dept: HOSPITAL 46 - DS | Age: 49
Discharge: HOME | End: 2024-10-18
Attending: SPECIALIST
Payer: COMMERCIAL

## 2024-10-18 VITALS — BODY MASS INDEX: 31.89 KG/M2 | HEIGHT: 68 IN | WEIGHT: 210.43 LBS

## 2024-10-18 VITALS — DIASTOLIC BLOOD PRESSURE: 75 MMHG | SYSTOLIC BLOOD PRESSURE: 127 MMHG

## 2024-10-18 VITALS — DIASTOLIC BLOOD PRESSURE: 64 MMHG | SYSTOLIC BLOOD PRESSURE: 102 MMHG

## 2024-10-18 DIAGNOSIS — G56.01: Primary | ICD-10-CM

## 2024-10-18 PROCEDURE — 01N50ZZ RELEASE MEDIAN NERVE, OPEN APPROACH: ICD-10-PCS | Performed by: SPECIALIST

## 2024-10-18 NOTE — NUR
10/18/24 Jama3 Zac Yeung
1016: PT ARRIVED TO PACU VIA STRETCHER. PT NON AROUSABLE AT THIS TIME.
PT ON 6L VIA MASK. PT HAS DRESSING IN PLACE TO RIGHT ARM. DRESSING
C/D/I. Josiane Noel, Phys,    Phone: ([phone]  Fax: ([phone]  Follow Up Time:

## 2024-10-18 NOTE — OR
[facility]
                                    [address], [place]  [address]
_________________________________________________________________________________________
                                                                 Signed   
 
 
DATE OF OPERATION:
10/18/2024
 
SURGEON:
Elaine Reyez MD
 
PREOPERATIVE DIAGNOSIS:
Carpal tunnel syndrome, right.
 
POSTOPERATIVE DIAGNOSIS:
Carpal tunnel syndrome, right.
 
PROCEDURE PERFORMED:
Right carpal tunnel release.
 
ASSISTANT:
None.
 
ANESTHESIA:
Mullins block.
 
TOURNIQUET TIME:
17 minutes.
 
BRIEF HISTORY:
Chris is a 48-year-old female with pain and numbness in her right hand.  Nerve
conduction studies were consistent with significant carpal tunnel.  Risks, benefits, and
alternatives were discussed with her of surgery and she elected to proceed. 
 
DESCRIPTION OF PROCEDURE:
Once consent was obtained, she was taken to the operating room. After adequate
anesthesia, she was placed on the operating room table. All downside pressure points
were well padded.  The right hand was prepped and draped in a standard sterile fashion.
The carpal tunnel was approached through a transverse 1.5 cm incision, carried through
the skin and subcutaneous tissue.  There was a bit of palmaris longus that was retracted
and protected.  The transverse carpal ligament was dissected free of overlying soft
tissue proximally and distally under loupe magnification.  Using tenotomy scissors, it
was then transected a cm proximally and distal to the distal extent of the ligament.
This was carefully palpated and visualized with loops and found to be released.  Wound
was copiously irrigated with normal saline, closed with 3-0 Stratafix, LiquiBand and
Steri- 
Strips.  The wound was dressed with Adaptic gauze and roll gauze.  She tolerated the
 
    Electronically Signed By: ELAINE REYEZ MD  10/18/24 1109
_________________________________________________________________________________________
PATIENT NAME:     CHRIS TAN               
MEDICAL RECORD #: [id number]            OPERATIVE REPORT              
          ACCT #: [id number]  
DATE OF BIRTH:   [birth year]            REPORT #: [id number]      
PHYSICIAN:        ELAINE REYEZ MD              
PCP:              TI LINTON  PAC      
REPORT IS CONFIDENTIAL AND NOT TO BE RELEASED WITHOUT AUTHORIZATION
 
 
                                  [address]
                                  [place], [place]  [address]
_________________________________________________________________________________________
                                                                 Signed   
 
 
procedure well.  All sponge, needle, and instrument counts were correct. 
 
 
 
            ________________________________________
            MD NAYELI Logan/AGUS
Job #:  [id number]
DD:  10/18/2024 10:15:10
DT:  10/18/2024 10:24:50
 
 
Copies:                                
~
 
 
 
 
 
 
 
 
 
 
 
 
 
 
 
 
 
 
 
 
 
 
 
 
 
 
 
    Electronically Signed By: ELAINE REYEZ MD  10/18/24 1109
_________________________________________________________________________________________
PATIENT NAME:     CHRIS TAN               
MEDICAL RECORD #: [id number]            OPERATIVE REPORT              
          ACCT #: [id number]  
DATE OF BIRTH:   [birth year]            REPORT #: [id number]      
PHYSICIAN:        ELAINE REYEZ MD              
PCP:              TI LINTON  PAC      
REPORT IS CONFIDENTIAL AND NOT TO BE RELEASED WITHOUT AUTHORIZATION